# Patient Record
Sex: FEMALE | Race: WHITE | URBAN - METROPOLITAN AREA
[De-identification: names, ages, dates, MRNs, and addresses within clinical notes are randomized per-mention and may not be internally consistent; named-entity substitution may affect disease eponyms.]

---

## 2017-08-01 ENCOUNTER — APPOINTMENT (OUTPATIENT)
Dept: CT IMAGING | Facility: CLINIC | Age: 77
End: 2017-08-01
Attending: EMERGENCY MEDICINE
Payer: COMMERCIAL

## 2017-08-01 ENCOUNTER — HOSPITAL ENCOUNTER (EMERGENCY)
Facility: CLINIC | Age: 77
Discharge: HOME | End: 2017-08-01
Attending: EMERGENCY MEDICINE | Admitting: EMERGENCY MEDICINE
Payer: COMMERCIAL

## 2017-08-01 ENCOUNTER — APPOINTMENT (OUTPATIENT)
Dept: GENERAL RADIOLOGY | Facility: CLINIC | Age: 77
End: 2017-08-01
Attending: EMERGENCY MEDICINE
Payer: COMMERCIAL

## 2017-08-01 VITALS
HEART RATE: 54 BPM | OXYGEN SATURATION: 100 % | DIASTOLIC BLOOD PRESSURE: 55 MMHG | WEIGHT: 154 LBS | BODY MASS INDEX: 27.29 KG/M2 | RESPIRATION RATE: 20 BRPM | TEMPERATURE: 97.8 F | HEIGHT: 63 IN | SYSTOLIC BLOOD PRESSURE: 165 MMHG

## 2017-08-01 DIAGNOSIS — S40.011A CONTUSION OF RIGHT SHOULDER, INITIAL ENCOUNTER: ICD-10-CM

## 2017-08-01 DIAGNOSIS — S09.90XA CLOSED HEAD INJURY, INITIAL ENCOUNTER: ICD-10-CM

## 2017-08-01 DIAGNOSIS — S16.1XXA STRAIN OF NECK MUSCLE, INITIAL ENCOUNTER: ICD-10-CM
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
## 2017-08-01 PROCEDURE — 99285 EMERGENCY DEPT VISIT HI MDM: CPT | Mod: 25

## 2017-08-01 PROCEDURE — 72125 CT NECK SPINE W/O DYE: CPT

## 2017-08-01 PROCEDURE — 70450 CT HEAD/BRAIN W/O DYE: CPT

## 2017-08-01 PROCEDURE — 73030 X-RAY EXAM OF SHOULDER: CPT | Mod: RT

## 2017-08-01 PROCEDURE — 25000132 ZZH RX MED GY IP 250 OP 250 PS 637

## 2017-08-01 RX ORDER — ACETAMINOPHEN 325 MG/1
650 TABLET ORAL ONCE
Status: COMPLETED | OUTPATIENT
Start: 2017-08-01 | End: 2017-08-01

## 2017-08-01 RX ORDER — ACETAMINOPHEN 325 MG/1
TABLET ORAL
Status: COMPLETED
Start: 2017-08-01 | End: 2017-08-01

## 2017-08-01 RX ADMIN — ACETAMINOPHEN 650 MG: 325 TABLET ORAL at 07:30

## 2017-08-01 ASSESSMENT — ENCOUNTER SYMPTOMS
HEADACHES: 1
VOMITING: 0
WEAKNESS: 0
NUMBNESS: 0
WOUND: 1
NAUSEA: 1
ARTHRALGIAS: 1

## 2017-08-01 NOTE — ED AVS SNAPSHOT
Emergency Department    [address] [facility] [address]    Phone:  [phone]    Fax:  [phone]                                       Lakshmi Villanueva   MRN: [id number]    Department:   Emergency Department   Date of Visit:  8/1/2017           Patient Information     Date Of Birth          1940        Your diagnoses for this visit were:     Fall, initial encounter     Closed head injury, initial encounter     Strain of neck muscle, initial encounter     Contusion of right shoulder, initial encounter        You were seen by Freedom Kiser MD.      Follow-up Information     Follow up with own md In 1 week.    Why:  For suture removal        Discharge Instructions       You got 650mg of tylenol at 7:30am     Discharge References/Attachments     CONTUSION, SHOULDER (ENGLISH)    HEAD INJURY, NO WAKE-UP (ADULT) (ENGLISH)    NECK SPRAIN OR STRAIN (ENGLISH)      24 Hour Appointment Hotline       To make an appointment at any [facility], call [phone] ([phone]). If you don't have a family doctor or clinic, we will help you find one. [facility] are conveniently located to serve the needs of you and your family.             Review of your medicines      Our records show that you are taking the medicines listed below. If these are incorrect, please call your family doctor or clinic.        Dose / Directions Last dose taken    citalopram 40 MG tablet   Commonly known as:  celeXA   Dose:  40 mg   Quantity:  30 tablet        Take 1 tablet by mouth daily.   Refills:  5        clonazePAM 0.5 MG tablet   Commonly known as:  klonoPIN   Dose:  0.5 mg   Quantity:  60 tablet        Take 1 tablet by mouth 2 times daily as needed.   Refills:  5        dicyclomine 20 MG tablet   Commonly known as:  BENTYL   Dose:  20 mg   Quantity:  120 tablet        Take 1 tablet by mouth 4 times daily as needed.   Refills:  11        lisinopril-hydrochlorothiazide 20-25 MG per tablet   Commonly  known as:  PRINZIDE/ZESTORETIC   Dose:  1 tablet   Quantity:  90 tablet        Take 1 tablet by mouth daily.   Refills:  1        order for DME   Quantity:  1 Device        Right wrist brace - use as instructed   Refills:  0        traMADol 50 MG tablet   Commonly known as:  ULTRAM   Dose:  50 mg   Quantity:  30 tablet        Take 1 tablet (50 mg) by mouth every 8 hours as needed for pain   Refills:  0                Procedures and tests performed during your visit     Cervical spine CT w/o contrast    Head CT w/o contrast    Shoulder XR, G/E 3 views, right      Orders Needing Specimen Collection     None      Pending Results     No orders found from 7/30/2017 to 8/2/2017.            Pending Culture Results     No orders found from 7/30/2017 to 8/2/2017.            Pending Results Instructions     If you had any lab results that were not finalized at the time of your Discharge, you can call the ED Lab Result RN at [phone]. You will be contacted by this team for any positive Lab results or changes in treatment. The nurses are available 7 days a week from 10A to 6:30P.  You can leave a message 24 hours per day and they will return your call.        Test Results From Your Hospital Stay        8/1/2017  8:04 AM      Narrative     CT HEAD WITHOUT CONTRAST August 1, 2017 7:48 AM    HISTORY: Fell and hit head.    TECHNIQUE: Scans were obtained through the head without IV contrast.  Radiation dose for this scan was reduced using automated exposure  control, adjustment of the mA and/or kV according to patient size, or  iterative reconstruction technique.    COMPARISON: None.    FINDINGS: Moderate generalized cerebral atrophy. Small areas of old  infarction right frontal white matter and right thalamus. Minimal  chronic white matter disease elsewhere consistent with small vessel  ischemic change.  No hemorrhage, mass lesion, or focal area of acute  infarction identified. Paranasal sinuses are normal. No  bony  abnormality.         Impression     IMPRESSION:   1. Atrophy and chronic white matter disease.  2. Old right frontal white matter infarcts.  3. Old right thalamic infarct.  4. Nothing acute.     FRANCISCO CABRAL MD         8/1/2017  8:04 AM      Narrative     CT CERVICAL SPINE WITHOUT CONTRAST August 1, 2017 7:50 AM    HISTORY: Fell. Now with neck pain.    COMPARISON: None.    TECHNIQUE: Routine CT cervical spine through T2. Radiation dose for  this scan was reduced using automated exposure control, adjustment of  the mA and/or kV according to patient size, or iterative  reconstruction technique.    FINDINGS: There is some precervical soft tissue swelling at the C3  level which likely is in carotid arteries which are somewhat medially  situated in their course. Vertebral body alignment is normal through  T2. There is no evidence for cervical spine fracture. There are  degenerative changes as follows:    C2-C3: Mild to moderate bilateral facet joint disease.    C3-C4: Severe facet joint disease on the left. Mild disc space  narrowing.    C4-C5: Moderate disc space narrowing. Moderate ventral ridge and  uncinate spurs. No significant central stenosis. Moderate foraminal  stenosis on the right. Mild facet joint disease.    C5-C6: Advanced narrowing of the interspace. Minimal uncinate spurs  right greater than left with moderate right-sided foraminal stenosis.    C6-C7: Mild bilateral facet joint disease.    C7-T1: Negative.        Impression     IMPRESSION:  1. No fracture or acute abnormality.  2. Multilevel degenerative change as described.    FRANCISCO CABRAL MD         8/1/2017  7:40 AM      Narrative     SHOULDER RIGHT THREE OR MORE VIEWS August 1, 2017 7:23 AM      HISTORY: Fall, pain.    COMPARISON: None.        Impression     IMPRESSION: No acute fracture or dislocation.     PILY VERA MD                Clinical Quality Measure: Blood Pressure Screening     Your blood pressure was checked while you  were in the emergency department today. The last reading we obtained was  BP: 179/76 . Please read the guidelines below about what these numbers mean and what you should do about them.  If your systolic blood pressure (the top number) is less than 120 and your diastolic blood pressure (the bottom number) is less than 80, then your blood pressure is normal. There is nothing more that you need to do about it.  If your systolic blood pressure (the top number) is 120-139 or your diastolic blood pressure (the bottom number) is 80-89, your blood pressure may be higher than it should be. You should have your blood pressure rechecked within a year by a primary care provider.  If your systolic blood pressure (the top number) is 140 or greater or your diastolic blood pressure (the bottom number) is 90 or greater, you may have high blood pressure. High blood pressure is treatable, but if left untreated over time it can put you at risk for heart attack, stroke, or kidney failure. You should have your blood pressure rechecked by a primary care provider within the next 4 weeks.  If your provider in the emergency department today gave you specific instructions to follow-up with your doctor or provider even sooner than that, you should follow that instruction and not wait for up to 4 weeks for your follow-up visit.        Thank you for choosing [facility]       Thank you for choosing [facility] for your care. Our goal is always to provide you with excellent care. Hearing back from our patients is one way we can continue to improve our services. Please take a few minutes to complete the written survey that you may receive in the mail after you visit with us. Thank you!        [email or web address] Information     [facility] gives you secure access to your electronic health record. If you see a primary care provider, you can also send messages to your care team and make appointments. If you have questions, please call your primary care clinic.  If you do  not have a primary care provider, please call [phone] and they will assist you.        Care EveryWhere ID     This is your Care EveryWhere ID. This could be used by other organizations to access your [place] medical records  [id number]        Equal Access to Services     RAHUL GARCIA : Jacques Cordoba, aliza araujo, abby alatorre, brissa quiroz. So [facility] [phone].    ATENCIÓN: Si habla español, tiene a najera disposición servicios gratuitos de asistencia lingüística. Llame al [phone].    We comply with applicable federal civil rights laws and [place] laws. We do not discriminate on the basis of race, color, national origin, age, disability sex, sexual orientation or gender identity.            After Visit Summary       This is your record. Keep this with you and show to your community pharmacist(s) and doctor(s) at your next visit.

## 2017-08-01 NOTE — ED AVS SNAPSHOT
Emergency Department    [address] [facility] [address]    Phone:  [phone]    Fax:  [phone]                                       Lakshmi Villanueva   MRN: [id number]    Department:   Emergency Department   Date of Visit:  8/1/2017           After Visit Summary Signature Page     I have received my discharge instructions, and my questions have been answered. I have discussed any challenges I see with this plan with the nurse or doctor.    ..........................................................................................................................................  Patient/Patient Representative Signature      ..........................................................................................................................................  Patient Representative Print Name and Relationship to Patient    ..................................................               ................................................  Date                                            Time    ..........................................................................................................................................  Reviewed by Signature/Title    ...................................................              ..............................................  Date                                                            Time

## 2017-08-01 NOTE — ED PROVIDER NOTES
"  History     Chief Complaint:  Fall and Head Laceration    HPI   Lakshmi Villanueva is a 76 year old female who presents to the emergency department today for evaluation of a fall and head laceration. [place] for her walker. The patient hit the back of her head, sustaining a laceration, but denies any loss of consciousness. She also reports a headache, right shoulder pain, and nausea earlier, and denies any vomiting, abnormal weakness or numbness in the arms or legs, or vision problems. The patient had a stroke in February 2017, and has baseline left leg weakness. She takes baby aspirin and states that her tetanus is up to date, and denies use of any other blood thinners.    Allergies:  No Known Drug Allergies    Medications:    Tramadol  Lisinopril  Citalopram  Dicyclomine  Clonazepam    Past Medical History:    Hyperlipidemia   Anxiety  Cataract  CKD  CVA  Hypertension goal <140/90  Mild major depression  Osteoarthritis of the knee    Past Surgical History:    Knee surgery    Family History:    History reviewed. No pertinent family history.     Social History:  The patient was accompanied bnySmoking Status: Former smoker  Smokeless Tobacco: Never used  Alcohol Use: No  Marital Status:  Single [1]  The patient lives alone in [facility].     Review of Systems   Eyes: Negative for visual disturbance.   Gastrointestinal: Positive for nausea. Negative for vomiting.   Musculoskeletal: Positive for arthralgias.   Skin: Positive for wound.   Neurological: Positive for headaches. Negative for syncope, weakness and numbness.   All other systems reviewed and are negative.    Physical Exam   Vitals:  Patient Vitals for the past 24 hrs:   BP Temp Temp src Pulse Heart Rate Resp SpO2 Height Weight   08/01/17 0836 165/55 - - 54 - 20 100 % - -   08/01/17 0800 179/76 - - 60 - 20 - - -   08/01/17 0642 194/72 97.8  F (36.6  C) Oral - 57 - 97 % 1.6 m (5' 3\") 69.9 kg " (154 lb)     Physical Exam  Constitutional: elderly white female, sitting  HENT: 2 cm laceration mid occiput, no bony step off  Eyes: EOM are normal. Pupils are equal, round, and reactive to light.   Neck: Normal range of motion. No JVD present. No cervical adenopathy. Posterior midline tenderness present  Cardiovascular: Regular rhythm.  Exam reveals no gallop and no friction rub.    No murmur heard.  Pulmonary/Chest: Bilateral breath sounds normal. No wheezes, rhonchi or rales.  Abdominal: Soft. No tenderness. No rebound or guarding.   Musculoskeletal: No edema. Right shoulder: full range of motion, no effusion, tenderness laterally. No tenderness over lumbar or thoracic spine.  Lymphadenopathy: No lymphadenopathy.   Neurological: Alert and oriented to person, place, and time. GCS 15. Fluent speech. No facial asymmetry, normal sensation. Normal strength. Coordination normal.   Skin: Skin is warm and dry. No rash noted. No erythema.     Emergency Department Course     Imaging:  Radiology findings were communicated with the patient who voiced understanding of the findings.    Cervical spine CT w/o contrast  1. No fracture or acute abnormality.  2. Multilevel degenerative change as described.  Reading per radiology    Head CT w/o contrast  1. Atrophy and chronic white matter disease.  2. Old right frontal white matter infarcts.  3. Old right thalamic infarct.  4. Nothing acute.   Reading per radiology    Shoulder XR, G/E 3 views, right  No acute fracture or dislocation.  Reading per radiology    Procedures:  Scalp laceration repair:   2 centimeters full thickness. 1% lidocaine with epinephrine was used for local anesthesia. The wound was cleaned and then approximated with three staples. Bacitracin applied as a dressing.    Interventions:  0730 acetaminophen 650 mg oral     Emergency Department Course:  Nursing notes and vitals reviewed.  I performed an exam of the patient as documented above.   The patient was sent  for a Cervical spine CT w/o contrast, Head CT w/o contrast, and Shoulder XR, G/E 3 views, right while in the emergency department, results above.   At 0822 the patient was rechecked and was updated on the results of imaging studies.   I discussed the treatment plan with the patient. They expressed understanding of this plan and consented to discharge. They will be discharged home with instructions for care and follow up. In addition, the patient will return to the emergency department if their symptoms persist, worsen, if new symptoms arise or if there is any concern.  All questions were answered.  I personally reviewed the imaging results with the Patient and answered all related questions prior to discharge.    Impression & Plan      Medical Decision Making:  Lakshmi Villanueva is a 76 year old female who [place] She tripped getting out of the car and struck the back of the head. She has no loss of consciousness, vomiting, no focal numbness or weakness. She does have some mild headache and mild neck discomfort. She is not on any anticoagulant. She notes her shots are up to date. On examination, there is a small laceration mid occiput with no bony step off. She does have some posterior neck tenderness and some right shoulder pain. CT of the head and cervical spine show nothing acute, and xray of the right shoulder shows no fracture. Patient will be discharged with family. She will actually go back to the airport and fly home to [place]. She is given a closed head trauma sheet, laceration sheet. She should use cold and tylenol and she will be given a staple remover, and should have the staples removed in one week.    Diagnosis:    ICD-10-CM    1. Fall, initial encounter W19.XXXA    2. Closed head injury, initial encounter S09.90XA    3. Strain of neck muscle, initial encounter S16.1XXA    4. Contusion of right shoulder, initial encounter S40.011A    5.      Scalp laceration    Disposition:   Home    Scribe  Disclosure:  I, Valeria Saucedo, am serving as a scribe at 7:09 AM on 8/1/2017 to document services personally performed by Freedom Kiser MD, based on my observations and the provider's statements to me.    8/1/2017    EMERGENCY DEPARTMENT       Freedom Kiser MD  08/01/17 2875

## 2019-11-07 ENCOUNTER — HEALTH MAINTENANCE LETTER (OUTPATIENT)
Age: 79
End: 2019-11-07

## 2020-02-23 ENCOUNTER — HEALTH MAINTENANCE LETTER (OUTPATIENT)
Age: 80
End: 2020-02-23

## 2020-12-06 ENCOUNTER — HEALTH MAINTENANCE LETTER (OUTPATIENT)
Age: 80
End: 2020-12-06

## 2021-04-11 ENCOUNTER — HEALTH MAINTENANCE LETTER (OUTPATIENT)
Age: 81
End: 2021-04-11

## 2021-09-25 ENCOUNTER — HEALTH MAINTENANCE LETTER (OUTPATIENT)
Age: 81
End: 2021-09-25

## 2022-05-07 ENCOUNTER — HEALTH MAINTENANCE LETTER (OUTPATIENT)
Age: 82
End: 2022-05-07

## 2022-12-26 ENCOUNTER — HEALTH MAINTENANCE LETTER (OUTPATIENT)
Age: 82
End: 2022-12-26

## 2023-06-02 ENCOUNTER — HEALTH MAINTENANCE LETTER (OUTPATIENT)
Age: 83
End: 2023-06-02